# Patient Record
Sex: FEMALE | URBAN - METROPOLITAN AREA
[De-identification: names, ages, dates, MRNs, and addresses within clinical notes are randomized per-mention and may not be internally consistent; named-entity substitution may affect disease eponyms.]

---

## 2023-06-12 ENCOUNTER — APPOINTMENT (OUTPATIENT)
Dept: URBAN - METROPOLITAN AREA SURGERY 19 | Age: 73
Setting detail: DERMATOLOGY
End: 2023-06-15

## 2023-06-12 VITALS — SYSTOLIC BLOOD PRESSURE: 154 MMHG | DIASTOLIC BLOOD PRESSURE: 69 MMHG | HEART RATE: 59 BPM | TEMPERATURE: 97.7 F

## 2023-06-12 VITALS — DIASTOLIC BLOOD PRESSURE: 63 MMHG | SYSTOLIC BLOOD PRESSURE: 135 MMHG | HEART RATE: 55 BPM

## 2023-06-12 PROBLEM — C44.41 BASAL CELL CARCINOMA OF SKIN OF SCALP AND NECK: Status: ACTIVE | Noted: 2023-06-12

## 2023-06-12 PROCEDURE — 12042 INTMD RPR N-HF/GENIT2.6-7.5: CPT

## 2023-06-12 PROCEDURE — OTHER MOHS SURGERY: OTHER

## 2023-06-12 PROCEDURE — 17311 MOHS 1 STAGE H/N/HF/G: CPT

## 2023-06-12 PROCEDURE — OTHER MIPS QUALITY: OTHER

## 2023-06-12 NOTE — PROCEDURE: MOHS SURGERY
Called to schedule [employer] AWV - lvm to return call to [phone]       Ear Star Wedge Flap Text: The defect edges were debeveled with a #15 blade scalpel.  Given the location of the defect and the proximity to free margins (helical rim) an ear star wedge flap was deemed most appropriate.  Using a sterile surgical marker, the appropriate flap was drawn incorporating the defect and placing the expected incisions between the helical rim and antihelix where possible.  The area thus outlined was incised through and through with a #15 scalpel blade.

## 2023-06-12 NOTE — PROCEDURE: MOHS SURGERY
EEG TECHNICIAN NOTE      NAME: Amparo Lazar YOB: 1968  MRN:    [id number]  DATE:  01/30/21    ASSESSMENT     Patient assessed for:    [x]Skin Breakdown:   1. [x] Skin WNL   2. [] Breakdown noted    A. RN Notified     B. Leads Repositioned   [x]Lead Placement:   1.[] All leads in Place   2.[x] Leads need Attention    A. Leads Replaced, Repositioned and Head Wrapped as needed.  [x] System Setup:   1. [x]System running as expected.   2. [] System Rebooted technical support called    3. [] technical support called     [x] Restock Equipment:   1. [x] Restocked    [x] Monitoring    1. [x] Continued    2. [] Discontinued    Summary     Initiated cEEG. [employer] contacted.impedance good and head wrapped.          Komal Lewis       EEG TECHNICIAN       Island Pedicle Flap With Canthal Suspension Text: The defect edges were debeveled with a #15 scalpel blade.  Given the location of the defect, shape of the defect and the proximity to free margins an island pedicle advancement flap was deemed most appropriate.  Using a sterile surgical marker, an appropriate advancement flap was drawn incorporating the defect, outlining the appropriate donor tissue and placing the expected incisions within the relaxed skin tension lines where possible. The area thus outlined was incised deep to adipose tissue with a #15 scalpel blade.  The skin margins were undermined to an appropriate distance in all directions around the primary defect and laterally outward around the island pedicle utilizing iris scissors.  There was minimal undermining beneath the pedicle flap. A suspension suture was placed in the canthal tendon to prevent tension and prevent ectropion.

## 2023-07-05 ENCOUNTER — APPOINTMENT (OUTPATIENT)
Dept: URBAN - METROPOLITAN AREA SURGERY 19 | Age: 73
Setting detail: DERMATOLOGY
End: 2023-07-05

## 2023-07-05 VITALS — HEART RATE: 60 BPM | SYSTOLIC BLOOD PRESSURE: 94 MMHG | DIASTOLIC BLOOD PRESSURE: 53 MMHG

## 2023-07-05 VITALS — DIASTOLIC BLOOD PRESSURE: 53 MMHG | SYSTOLIC BLOOD PRESSURE: 103 MMHG | HEART RATE: 66 BPM

## 2023-07-05 PROBLEM — C44.319 BASAL CELL CARCINOMA OF SKIN OF OTHER PARTS OF FACE: Status: ACTIVE | Noted: 2023-07-05

## 2023-07-05 PROCEDURE — 12052 INTMD RPR FACE/MM 2.6-5.0 CM: CPT

## 2023-07-05 PROCEDURE — 17311 MOHS 1 STAGE H/N/HF/G: CPT

## 2023-07-05 PROCEDURE — OTHER MOHS SURGERY: OTHER

## 2023-07-05 PROCEDURE — OTHER MIPS QUALITY: OTHER

## 2024-08-13 NOTE — HPI: SKIN LESION (BASAL CELL CARCINOMA)
Head, normocephalic, atraumatic, Face, Face within normal limits, Ears, External ears within normal limits Accession # (Optional): [id number] Accession # (Optional): [id number]
